# Patient Record
Sex: FEMALE | Race: OTHER | Employment: OTHER | URBAN - METROPOLITAN AREA
[De-identification: names, ages, dates, MRNs, and addresses within clinical notes are randomized per-mention and may not be internally consistent; named-entity substitution may affect disease eponyms.]

---

## 2017-08-06 ENCOUNTER — HOSPITAL ENCOUNTER (EMERGENCY)
Age: 82
Discharge: HOME | End: 2017-08-06
Attending: EMERGENCY MEDICINE
Payer: SELF-PAY

## 2017-08-06 ENCOUNTER — APPOINTMENT (OUTPATIENT)
Dept: GENERAL RADIOLOGY | Age: 82
End: 2017-08-06
Attending: EMERGENCY MEDICINE
Payer: SELF-PAY

## 2017-08-06 ENCOUNTER — APPOINTMENT (OUTPATIENT)
Dept: CT IMAGING | Age: 82
End: 2017-08-06
Attending: EMERGENCY MEDICINE
Payer: SELF-PAY

## 2017-08-06 VITALS
TEMPERATURE: 98.2 F | DIASTOLIC BLOOD PRESSURE: 83 MMHG | WEIGHT: 100 LBS | SYSTOLIC BLOOD PRESSURE: 167 MMHG | HEART RATE: 79 BPM | RESPIRATION RATE: 20 BRPM | OXYGEN SATURATION: 99 %

## 2017-08-06 DIAGNOSIS — R03.0 ELEVATED BLOOD PRESSURE READING: ICD-10-CM

## 2017-08-06 DIAGNOSIS — R06.00 DYSPNEA, UNSPECIFIED TYPE: Primary | ICD-10-CM

## 2017-08-06 LAB
ABO + RH BLD: NORMAL
ALBUMIN SERPL BCP-MCNC: 3.2 G/DL (ref 3.4–5)
ALBUMIN/GLOB SERPL: 0.7 {RATIO} (ref 0.8–1.7)
ALP SERPL-CCNC: 152 U/L (ref 45–117)
ALT SERPL-CCNC: 15 U/L (ref 13–56)
ANION GAP BLD CALC-SCNC: 9 MMOL/L (ref 3–18)
ASPIRIN TEST, ASPIRN: 571 ARU
AST SERPL W P-5'-P-CCNC: 21 U/L (ref 15–37)
BILIRUB SERPL-MCNC: 0.2 MG/DL (ref 0.2–1)
BLOOD GROUP ANTIBODIES SERPL: NORMAL
BUN SERPL-MCNC: 25 MG/DL (ref 7–18)
BUN/CREAT SERPL: 15 (ref 12–20)
CALCIUM SERPL-MCNC: 8.1 MG/DL (ref 8.5–10.1)
CHLORIDE SERPL-SCNC: 103 MMOL/L (ref 100–108)
CK MB CFR SERPL CALC: 3 % (ref 0–4)
CK MB SERPL-MCNC: 2.2 NG/ML (ref 5–25)
CK SERPL-CCNC: 73 U/L (ref 26–192)
CO2 SERPL-SCNC: 27 MMOL/L (ref 21–32)
CREAT SERPL-MCNC: 1.69 MG/DL (ref 0.6–1.3)
ERYTHROCYTE [DISTWIDTH] IN BLOOD BY AUTOMATED COUNT: 13.5 % (ref 11.6–14.5)
FIBRINOGEN PPP-MCNC: 364 MG/DL (ref 210–451)
GLOBULIN SER CALC-MCNC: 4.6 G/DL (ref 2–4)
GLUCOSE SERPL-MCNC: 135 MG/DL (ref 74–99)
HCT VFR BLD AUTO: 31.4 % (ref 35–45)
HGB BLD-MCNC: 10.2 G/DL (ref 12–16)
INR PPP: 1.1 (ref 0.8–1.2)
MCH RBC QN AUTO: 30.2 PG (ref 24–34)
MCHC RBC AUTO-ENTMCNC: 32.5 G/DL (ref 31–37)
MCV RBC AUTO: 92.9 FL (ref 74–97)
P2Y12 PLT RESPONSE,PPPR: 305 PRU (ref 194–418)
PLATELET # BLD AUTO: 271 K/UL (ref 135–420)
PMV BLD AUTO: 10.1 FL (ref 9.2–11.8)
POTASSIUM SERPL-SCNC: 4.1 MMOL/L (ref 3.5–5.5)
PROT SERPL-MCNC: 7.8 G/DL (ref 6.4–8.2)
PROTHROMBIN TIME: 13.9 SEC (ref 11.5–15.2)
RBC # BLD AUTO: 3.38 M/UL (ref 4.2–5.3)
SODIUM SERPL-SCNC: 139 MMOL/L (ref 136–145)
SPECIMEN EXP DATE BLD: NORMAL
THROMBIN TIME: 15 SECS (ref 13.8–18.2)
TROPONIN I SERPL-MCNC: 0.03 NG/ML (ref 0–0.04)
WBC # BLD AUTO: 10.2 K/UL (ref 4.6–13.2)

## 2017-08-06 PROCEDURE — 82550 ASSAY OF CK (CPK): CPT | Performed by: EMERGENCY MEDICINE

## 2017-08-06 PROCEDURE — 74011000250 HC RX REV CODE- 250: Performed by: EMERGENCY MEDICINE

## 2017-08-06 PROCEDURE — 94640 AIRWAY INHALATION TREATMENT: CPT

## 2017-08-06 PROCEDURE — 70450 CT HEAD/BRAIN W/O DYE: CPT

## 2017-08-06 PROCEDURE — 93005 ELECTROCARDIOGRAM TRACING: CPT

## 2017-08-06 PROCEDURE — 85384 FIBRINOGEN ACTIVITY: CPT | Performed by: EMERGENCY MEDICINE

## 2017-08-06 PROCEDURE — 71010 XR CHEST PORT: CPT

## 2017-08-06 PROCEDURE — 77030029684 HC NEB SM VOL KT MONA -A

## 2017-08-06 PROCEDURE — 99285 EMERGENCY DEPT VISIT HI MDM: CPT

## 2017-08-06 PROCEDURE — 85027 COMPLETE CBC AUTOMATED: CPT | Performed by: EMERGENCY MEDICINE

## 2017-08-06 PROCEDURE — 85576 BLOOD PLATELET AGGREGATION: CPT | Performed by: EMERGENCY MEDICINE

## 2017-08-06 PROCEDURE — 80053 COMPREHEN METABOLIC PANEL: CPT | Performed by: EMERGENCY MEDICINE

## 2017-08-06 PROCEDURE — 85670 THROMBIN TIME PLASMA: CPT | Performed by: EMERGENCY MEDICINE

## 2017-08-06 PROCEDURE — 85610 PROTHROMBIN TIME: CPT | Performed by: EMERGENCY MEDICINE

## 2017-08-06 PROCEDURE — 86900 BLOOD TYPING SEROLOGIC ABO: CPT | Performed by: EMERGENCY MEDICINE

## 2017-08-06 RX ORDER — ALBUTEROL SULFATE 90 UG/1
2 AEROSOL, METERED RESPIRATORY (INHALATION)
Qty: 1 INHALER | Refills: 0 | Status: SHIPPED | OUTPATIENT
Start: 2017-08-06

## 2017-08-06 RX ORDER — IPRATROPIUM BROMIDE AND ALBUTEROL SULFATE 2.5; .5 MG/3ML; MG/3ML
3 SOLUTION RESPIRATORY (INHALATION)
Status: COMPLETED | OUTPATIENT
Start: 2017-08-06 | End: 2017-08-06

## 2017-08-06 RX ADMIN — IPRATROPIUM BROMIDE AND ALBUTEROL SULFATE 3 ML: .5; 3 SOLUTION RESPIRATORY (INHALATION) at 18:38

## 2017-08-06 NOTE — ED TRIAGE NOTES
Per EMS pt was complaining of chest pain at the beach after getting out of the water. Pt reports history of asthma and HTN.

## 2017-08-06 NOTE — DISCHARGE INSTRUCTIONS
High Blood Pressure: Care Instructions  Your Care Instructions  If your blood pressure is usually above 140/90, you have high blood pressure, or hypertension. That means the top number is 140 or higher or the bottom number is 90 or higher, or both. Despite what a lot of people think, high blood pressure usually doesn't cause headaches or make you feel dizzy or lightheaded. It usually has no symptoms. But it does increase your risk for heart attack, stroke, and kidney or eye damage. The higher your blood pressure, the more your risk increases. Your doctor will give you a goal for your blood pressure. Your goal will be based on your health and your age. An example of a goal is to keep your blood pressure below 140/90. Lifestyle changes, such as eating healthy and being active, are always important to help lower blood pressure. You might also take medicine to reach your blood pressure goal.  Follow-up care is a key part of your treatment and safety. Be sure to make and go to all appointments, and call your doctor if you are having problems. It's also a good idea to know your test results and keep a list of the medicines you take. How can you care for yourself at home? Medical treatment  · If you stop taking your medicine, your blood pressure will go back up. You may take one or more types of medicine to lower your blood pressure. Be safe with medicines. Take your medicine exactly as prescribed. Call your doctor if you think you are having a problem with your medicine. · Talk to your doctor before you start taking aspirin every day. Aspirin can help certain people lower their risk of a heart attack or stroke. But taking aspirin isn't right for everyone, because it can cause serious bleeding. · See your doctor regularly. You may need to see the doctor more often at first or until your blood pressure comes down.   · If you are taking blood pressure medicine, talk to your doctor before you take decongestants or anti-inflammatory medicine, such as ibuprofen. Some of these medicines can raise blood pressure. · Learn how to check your blood pressure at home. Lifestyle changes  · Stay at a healthy weight. This is especially important if you put on weight around the waist. Losing even 10 pounds can help you lower your blood pressure. · If your doctor recommends it, get more exercise. Walking is a good choice. Bit by bit, increase the amount you walk every day. Try for at least 30 minutes on most days of the week. You also may want to swim, bike, or do other activities. · Avoid or limit alcohol. Talk to your doctor about whether you can drink any alcohol. · Try to limit how much sodium you eat to less than 2,300 milligrams (mg) a day. Your doctor may ask you to try to eat less than 1,500 mg a day. · Eat plenty of fruits (such as bananas and oranges), vegetables, legumes, whole grains, and low-fat dairy products. · Lower the amount of saturated fat in your diet. Saturated fat is found in animal products such as milk, cheese, and meat. Limiting these foods may help you lose weight and also lower your risk for heart disease. · Do not smoke. Smoking increases your risk for heart attack and stroke. If you need help quitting, talk to your doctor about stop-smoking programs and medicines. These can increase your chances of quitting for good. When should you call for help? Call 911 anytime you think you may need emergency care. This may mean having symptoms that suggest that your blood pressure is causing a serious heart or blood vessel problem. Your blood pressure may be over 180/110. For example, call 911 if:  · You have symptoms of a heart attack. These may include:  ¨ Chest pain or pressure, or a strange feeling in the chest.  ¨ Sweating. ¨ Shortness of breath. ¨ Nausea or vomiting. ¨ Pain, pressure, or a strange feeling in the back, neck, jaw, or upper belly or in one or both shoulders or arms.   ¨ Lightheadedness or sudden weakness. ¨ A fast or irregular heartbeat. · You have symptoms of a stroke. These may include:  ¨ Sudden numbness, tingling, weakness, or loss of movement in your face, arm, or leg, especially on only one side of your body. ¨ Sudden vision changes. ¨ Sudden trouble speaking. ¨ Sudden confusion or trouble understanding simple statements. ¨ Sudden problems with walking or balance. ¨ A sudden, severe headache that is different from past headaches. · You have severe back or belly pain. Do not wait until your blood pressure comes down on its own. Get help right away. Call your doctor now or seek immediate care if:  · Your blood pressure is much higher than normal (such as 180/110 or higher), but you don't have symptoms. · You think high blood pressure is causing symptoms, such as:  ¨ Severe headache. ¨ Blurry vision. Watch closely for changes in your health, and be sure to contact your doctor if:  · Your blood pressure measures 140/90 or higher at least 2 times. That means the top number is 140 or higher or the bottom number is 90 or higher, or both. · You think you may be having side effects from your blood pressure medicine. · Your blood pressure is usually normal, but it goes above normal at least 2 times. Where can you learn more? Go to http://caren-elvia.info/. Enter X392 in the search box to learn more about \"High Blood Pressure: Care Instructions. \"  Current as of: August 8, 2016  Content Version: 11.3  © 4072-2388 [employer]. Care instructions adapted under license by [employer] (which disclaims liability or warranty for this information). If you have questions about a medical condition or this instruction, always ask your healthcare professional. [place] [address] any warranty or liability for your use of this information.        Shortness of Breath: Care Instructions  Your Care Instructions  Shortness of breath has many causes. Sometimes conditions such as anxiety can lead to shortness of breath. Some people get mild shortness of breath when they exercise. Trouble breathing also can be a symptom of a serious problem, such as asthma, lung disease, emphysema, heart problems, and pneumonia. If your shortness of breath continues, you may need tests and treatment. Watch for any changes in your breathing and other symptoms. Follow-up care is a key part of your treatment and safety. Be sure to make and go to all appointments, and call your doctor if you are having problems. Its also a good idea to know your test results and keep a list of the medicines you take. How can you care for yourself at home? · Do not smoke or allow others to smoke around you. If you need help quitting, talk to your doctor about stop-smoking programs and medicines. These can increase your chances of quitting for good. · Get plenty of rest and sleep. · Take your medicines exactly as prescribed. Call your doctor if you think you are having a problem with your medicine. · Find healthy ways to deal with stress. ¨ Exercise daily. ¨ Get plenty of sleep. ¨ Eat regularly and well. When should you call for help? Call 911 anytime you think you may need emergency care. For example, call if:  · You have severe shortness of breath. · You have symptoms of a heart attack. These may include:  ¨ Chest pain or pressure, or a strange feeling in the chest.  ¨ Sweating. ¨ Shortness of breath. ¨ Nausea or vomiting. ¨ Pain, pressure, or a strange feeling in the back, neck, jaw, or upper belly or in one or both shoulders or arms. ¨ Lightheadedness or sudden weakness. ¨ A fast or irregular heartbeat. After you call 911, the  may tell you to chew 1 adult-strength or 2 to 4 low-dose aspirin. Wait for an ambulance. Do not try to drive yourself. Call your doctor now or seek immediate medical care if:  · Your shortness of breath gets worse or you start to wheeze. Wheezing is a high-pitched sound when you breathe. · You wake up at night out of breath or have to prop your head up on several pillows to breathe. · You are short of breath after only light activity or while at rest.  Watch closely for changes in your health, and be sure to contact your doctor if:  · You do not get better over the next 1 to 2 days. Where can you learn more? Go to http://caren-elvia.info/. Enter S780 in the search box to learn more about \"Shortness of Breath: Care Instructions. \"  Current as of: March 25, 2017  Content Version: 11.3  © 0591-5505 [employer]. Care instructions adapted under license by [employer] (which disclaims liability or warranty for this information). If you have questions about a medical condition or this instruction, always ask your healthcare professional. [address] any warranty or liability for your use of this information.

## 2017-08-06 NOTE — ED PROVIDER NOTES
HPI Comments: 5:42 PM  The patient is a 81 y.o., [place] speaking female with history of asthma, cardiac disease and HTN who presents to the ED with SOB, CP and CVA which started at [facility] today. Pt states that at that time, she had just finished swimming at the beach. She was brought in by EMS who state that her BP was very much elevated when they found her. History is otherwise limited by language barrier. The history is provided by the patient. A  was used. No past medical history on file. No past surgical history on file. No family history on file. Social History     Social History    Marital status:      Spouse name: N/A    Number of children: N/A    Years of education: N/A     Occupational History    Not on file. Social History Main Topics    Smoking status: Not on file    Smokeless tobacco: Not on file    Alcohol use Not on file    Drug use: Not on file    Sexual activity: Not on file     Other Topics Concern    Not on file     Social History Narrative         ALLERGIES: Review of patient's allergies indicates no known allergies. Review of Systems   Unable to perform ROS: Other (due to acuity of condition)       Vitals:    08/06/17 1750 08/06/17 1800 08/06/17 1809 08/06/17 1812   BP: 170/84 150/65     Pulse: 100 85     Resp: 25 21     Temp:   98.2 °F (36.8 °C)    SpO2:  99%     Weight:    45.4 kg (100 lb)            Physical Exam   Constitutional: She appears well-developed and well-nourished. HENT:   Head: Normocephalic and atraumatic. Neck: Neck supple. No JVD present. Cardiovascular: Normal rate and regular rhythm. Pulmonary/Chest: Effort normal. No respiratory distress. She has wheezes. Abdominal: Soft. She exhibits no distension. There is no tenderness. There is no rebound and no guarding. Musculoskeletal: She exhibits no edema.    Pain in R leg, decreased ROM (unclear if weakness vs pain)  BL UE strength 5/5 Neurological: She is alert. Answering all questions, mild confusion   Skin: Skin is warm and dry. No erythema. Psychiatric: Judgment normal.        MDM  Number of Diagnoses or Management Options  Dyspnea, unspecified type:   Elevated blood pressure reading:   Diagnosis management comments: 82 y/o female presents with possible R leg weakness and sob.      Pt made code S upon arrival when possible weakness found, due to unclear history    Check basic labs, cxr, ct head         Amount and/or Complexity of Data Reviewed  Clinical lab tests: ordered and reviewed  Tests in the radiology section of CPT®: ordered and reviewed  Tests in the medicine section of CPT®: reviewed and ordered      ED Course       Procedures    Vitals:  Patient Vitals for the past 12 hrs:   Temp Pulse Resp BP SpO2   08/06/17 1809 98.2 °F (36.8 °C) - - - -   08/06/17 1800 - 85 21 150/65 99 %   08/06/17 1750 - 100 25 170/84 -       Medications Ordered:  Medications   albuterol-ipratropium (DUO-NEB) 2.5 MG-0.5 MG/3 ML (3 mL Nebulization Given 8/6/17 1838)       Lab Findings:  Recent Results (from the past 12 hour(s))   CBC W/O DIFF    Collection Time: 08/06/17  5:30 PM   Result Value Ref Range    WBC 10.2 4.6 - 13.2 K/uL    RBC 3.38 (L) 4.20 - 5.30 M/uL    HGB 10.2 (L) 12.0 - 16.0 g/dL    HCT 31.4 (L) 35.0 - 45.0 %    MCV 92.9 74.0 - 97.0 FL    MCH 30.2 24.0 - 34.0 PG    MCHC 32.5 31.0 - 37.0 g/dL    RDW 13.5 11.6 - 14.5 %    PLATELET 580 659 - 267 K/uL    MPV 10.1 9.2 - 64.6 FL   METABOLIC PANEL, COMPREHENSIVE    Collection Time: 08/06/17  5:30 PM   Result Value Ref Range    Sodium 139 136 - 145 mmol/L    Potassium 4.1 3.5 - 5.5 mmol/L    Chloride 103 100 - 108 mmol/L    CO2 27 21 - 32 mmol/L    Anion gap 9 3.0 - 18 mmol/L    Glucose 135 (H) 74 - 99 mg/dL    BUN 25 (H) 7.0 - 18 MG/DL    Creatinine 1.69 (H) 0.6 - 1.3 MG/DL    BUN/Creatinine ratio 15 12 - 20      GFR est AA 35 (L) >60 ml/min/1.73m2    GFR est non-AA 29 (L) >60 ml/min/1.73m2 Calcium 8.1 (L) 8.5 - 10.1 MG/DL    Bilirubin, total 0.2 0.2 - 1.0 MG/DL    ALT (SGPT) 15 13 - 56 U/L    AST (SGOT) 21 15 - 37 U/L    Alk.  phosphatase 152 (H) 45 - 117 U/L    Protein, total 7.8 6.4 - 8.2 g/dL    Albumin 3.2 (L) 3.4 - 5.0 g/dL    Globulin 4.6 (H) 2.0 - 4.0 g/dL    A-G Ratio 0.7 (L) 0.8 - 1.7     PROTHROMBIN TIME + INR    Collection Time: 08/06/17  5:30 PM   Result Value Ref Range    Prothrombin time 13.9 11.5 - 15.2 sec    INR 1.1 0.8 - 1.2     THROMBIN TIME    Collection Time: 08/06/17  5:30 PM   Result Value Ref Range    Thrombin time 15.0 13.8 - 18.2 SECS   CARDIAC PANEL,(CK, CKMB & TROPONIN)    Collection Time: 08/06/17  5:30 PM   Result Value Ref Range    CK 73 26 - 192 U/L    CK - MB 2.2 <3.6 ng/ml    CK-MB Index 3.0 0.0 - 4.0 %    Troponin-I, Qt. 0.03 0.0 - 0.045 NG/ML   FIBRINOGEN    Collection Time: 08/06/17  5:30 PM   Result Value Ref Range    Fibrinogen 364 210 - 451 mg/dL   ASPIRIN TEST    Collection Time: 08/06/17  5:30 PM   Result Value Ref Range    Aspirin test 571 ARU   PLATELET FUNCTION, VERIFY NOW P2Y12    Collection Time: 08/06/17  5:30 PM   Result Value Ref Range    P2Y12 Plt response 305 194 - 418 PRU   TYPE & SCREEN    Collection Time: 08/06/17  5:45 PM   Result Value Ref Range    Crossmatch Expiration 08/09/2017     ABO/Rh(D) PENDING     Antibody screen PENDING    EKG, 12 LEAD, INITIAL    Collection Time: 08/06/17  5:56 PM   Result Value Ref Range    Ventricular Rate 80 BPM    Atrial Rate 80 BPM    P-R Interval 124 ms    QRS Duration 74 ms    Q-T Interval 330 ms    QTC Calculation (Bezet) 380 ms    Calculated P Axis 70 degrees    Calculated R Axis -38 degrees    Calculated T Axis 55 degrees    Diagnosis       Normal sinus rhythm with sinus arrhythmia  Left axis deviation  Abnormal ECG  No previous ECGs available         EKG Interpretation by ED physician:  Rhythm: SR  Rate: 80 bpm  Interpretation: No ST changes; core baseline due to motion artifact   EKG interpret by Allan Phoenix Prosper Gardiner,  17:56 PM          X-ray, CT or radiology findings or impressions:    XR CHEST PORT  Preliminary Read by Dr. Kristin Hicksed:     Bibasilar bronglass opacity   No focal infiltrate    CT HEAD WO CONT  IMPRESSION:  Marked diffuse volume loss, however, no acute findings. Interpreted by radiologist.       Progress notes, consult notes, or additional procedure notes:    5:36 PM Consult: I discussed care with Dr. Karlos Krishnan (Teleneurologist). It was a standard discussion including patient history, chief complaint, available diagnostic results, and predicted treatment course. He will consult on pt. Noted slight wheeze on exam. Luis Eduardo Mejía is now reporting pt had SOB today after swimming. He reports she has been treated for chronic breathing problems, although not currently on meds for this. [place] to country [place]. Also reports smoke exposure to wood stove. Will give [place], dc home with albuterol. issued cxr results, suspect chronic changes, advised need for pcp follow up.     sats normal, no sign of PE. No indication for abx at this time, chronic cough, unchanged. Diagnosis:   1. Dyspnea, unspecified type    2. Elevated blood pressure reading        Disposition: Discharged    Follow-up Information     Follow up With Details Comments [facility] In 2 days  [address] [address]  [phone]           Patient's Medications   Start Taking    ALBUTEROL (PROVENTIL HFA, VENTOLIN HFA, PROAIR HFA) 90 MCG/ACTUATION INHALER    Take 2 Puffs by inhalation every four (4) hours as needed for Wheezing.    Continue Taking    No medications on file   These Medications have changed    No medications on file   Stop Taking    No medications on file       Scribe Attestation      Sandy Momin acting as a scribe for and in the presence of Ebony Herron DO      August 06, 2017        Provider Attestation:      I personally performed the services described in the documentation, reviewed the documentation, as recorded by the scribe in my presence, and it accurately and completely records my words and actions.  August 06, 2017  Cielo Cox DO

## 2017-08-06 NOTE — ED NOTES
Code S called at [phone] for possible stroke, pt comp[laining of right leg weakness and was forgetful at the time, which the daughter now reports is her baseline. Code S was canceled by Teleneurologist. Pt also reports she does not know how to read or write.

## 2017-08-06 NOTE — ED NOTES
Pt information was input wrong by registration, they said they are not able to change it until she is discharged.

## 2017-08-07 LAB
ATRIAL RATE: 80 BPM
CALCULATED P AXIS, ECG09: 70 DEGREES
CALCULATED R AXIS, ECG10: -38 DEGREES
CALCULATED T AXIS, ECG11: 55 DEGREES
DIAGNOSIS, 93000: NORMAL
P-R INTERVAL, ECG05: 124 MS
Q-T INTERVAL, ECG07: 330 MS
QRS DURATION, ECG06: 74 MS
QTC CALCULATION (BEZET), ECG08: 380 MS
VENTRICULAR RATE, ECG03: 80 BPM